# Patient Record
Sex: MALE | Race: WHITE | NOT HISPANIC OR LATINO
[De-identification: names, ages, dates, MRNs, and addresses within clinical notes are randomized per-mention and may not be internally consistent; named-entity substitution may affect disease eponyms.]

---

## 2018-07-30 ENCOUNTER — CHARTING TRANS (OUTPATIENT)
Dept: OTHER | Age: 68
End: 2018-07-30

## 2019-01-01 ENCOUNTER — EXTERNAL RECORD (OUTPATIENT)
Dept: HEALTH INFORMATION MANAGEMENT | Facility: OTHER | Age: 69
End: 2019-01-01

## 2019-06-07 ENCOUNTER — TELEPHONE (OUTPATIENT)
Dept: INTERNAL MEDICINE | Age: 69
End: 2019-06-07

## 2019-06-14 ENCOUNTER — LAB SERVICES (OUTPATIENT)
Dept: LAB | Age: 69
End: 2019-06-14

## 2019-06-14 ENCOUNTER — OFFICE VISIT (OUTPATIENT)
Dept: INTERNAL MEDICINE | Age: 69
End: 2019-06-14

## 2019-06-14 DIAGNOSIS — R55 VASOVAGAL SYNCOPE: ICD-10-CM

## 2019-06-14 DIAGNOSIS — D72.829 LEUKOCYTOSIS, UNSPECIFIED TYPE: ICD-10-CM

## 2019-06-14 DIAGNOSIS — D72.829 LEUKOCYTOSIS, UNSPECIFIED TYPE: Primary | ICD-10-CM

## 2019-06-14 LAB
DIFFERENTIAL TYPE: ABNORMAL
HEMATOCRIT: 46.9 % (ref 40–51)
HEMOGLOBIN: 16 G/DL (ref 13.7–17.5)
LYMPH PERCENT: 15 % (ref 20.5–51.1)
LYMPHOCYTE ABSOLUTE #: 0.8 10*3/UL (ref 1.2–3.4)
MEAN CORPUSCULAR HGB CONCENTRATION: 34.1 % (ref 32–36)
MEAN CORPUSCULAR HGB: 30.7 PG (ref 27–34)
MEAN CORPUSCULAR VOLUME: 90 FL (ref 79–95)
MEAN PLATELET VOLUME: 8.8 FL (ref 8.6–12.4)
MIXED %: 15.3 % (ref 4.3–12.9)
MIXED ABSOLUTE #: 0.8 10*3/UL (ref 0.2–0.9)
NEUTROPHIL ABSOLUTE #: 3.6 10*3/UL (ref 1.4–6.5)
NEUTROPHIL PERCENT: 69.7 % (ref 34–73.5)
PLATELET COUNT: 241 10*3/UL (ref 150–400)
RED BLOOD CELL COUNT: 5.21 10*6/UL (ref 3.9–5.7)
RED CELL DISTRIBUTION WIDTH: 13.3 % (ref 11.3–14.8)
WHITE BLOOD CELL COUNT: 5.2 10*3/UL (ref 4–10)

## 2019-06-14 PROCEDURE — 85025 COMPLETE CBC W/AUTO DIFF WBC: CPT | Performed by: INTERNAL MEDICINE

## 2019-06-14 PROCEDURE — 36415 COLL VENOUS BLD VENIPUNCTURE: CPT | Performed by: INTERNAL MEDICINE

## 2019-06-14 PROCEDURE — 99204 OFFICE O/P NEW MOD 45 MIN: CPT | Performed by: INTERNAL MEDICINE

## 2019-06-14 RX ORDER — ZALEPLON 10 MG/1
10 CAPSULE ORAL
COMMUNITY
Start: 2019-01-22

## 2019-06-14 RX ORDER — BIOTIN 1 MG
TABLET ORAL
COMMUNITY

## 2019-06-14 RX ORDER — FLUOCINONIDE 0.5 MG/G
OINTMENT TOPICAL 2 TIMES DAILY
COMMUNITY

## 2019-06-14 RX ORDER — LANOLIN ALCOHOL/MO/W.PET/CERES
1000 CREAM (GRAM) TOPICAL DAILY
COMMUNITY

## 2019-06-14 RX ORDER — AMLODIPINE BESYLATE 5 MG/1
5 TABLET ORAL
COMMUNITY
Start: 2019-01-22

## 2019-06-14 RX ORDER — FLUOCINONIDE TOPICAL SOLUTION USP, 0.05% 0.5 MG/ML
SOLUTION TOPICAL 2 TIMES DAILY
COMMUNITY

## 2019-06-14 SDOH — HEALTH STABILITY: PHYSICAL HEALTH: ON AVERAGE, HOW MANY DAYS PER WEEK DO YOU ENGAGE IN MODERATE TO STRENUOUS EXERCISE (LIKE A BRISK WALK)?: 0 DAYS

## 2019-06-14 SDOH — HEALTH STABILITY: MENTAL HEALTH
STRESS IS WHEN SOMEONE FEELS TENSE, NERVOUS, ANXIOUS, OR CAN'T SLEEP AT NIGHT BECAUSE THEIR MIND IS TROUBLED. HOW STRESSED ARE YOU?: ONLY A LITTLE

## 2019-06-14 SDOH — HEALTH STABILITY: PHYSICAL HEALTH: ON AVERAGE, HOW MANY MINUTES DO YOU ENGAGE IN EXERCISE AT THIS LEVEL?: 0 MIN

## 2019-06-14 ASSESSMENT — COGNITIVE AND FUNCTIONAL STATUS - GENERAL
BECAUSE OF A PHYSICAL, MENTAL, OR EMOTIONAL CONDITION, DO YOU HAVE SERIOUS DIFFICULTY CONCENTRATING, REMEMBERING OR MAKING DECISIONS: NO
BECAUSE OF A PHYSICAL, MENTAL, OR EMOTIONAL CONDITION, DO YOU HAVE DIFFICULTY DOING ERRANDS ALONE: NO
DO YOU HAVE DIFFICULTY DRESSING OR BATHING: NO
DO YOU HAVE SERIOUS DIFFICULTY WALKING OR CLIMBING STAIRS: NO

## 2019-06-14 ASSESSMENT — PAIN SCALES - GENERAL: PAINLEVEL: 3-4

## 2019-06-14 ASSESSMENT — PATIENT HEALTH QUESTIONNAIRE - PHQ9
2. FEELING DOWN, DEPRESSED OR HOPELESS: SEVERAL DAYS
SUM OF ALL RESPONSES TO PHQ9 QUESTIONS 1 AND 2: 1
SUM OF ALL RESPONSES TO PHQ9 QUESTIONS 1 AND 2: 1
1. LITTLE INTEREST OR PLEASURE IN DOING THINGS: NOT AT ALL

## 2019-08-02 ENCOUNTER — TELEPHONE (OUTPATIENT)
Dept: INTERNAL MEDICINE | Age: 69
End: 2019-08-02

## 2019-08-06 ENCOUNTER — OFFICE VISIT (OUTPATIENT)
Dept: PAIN CLINIC | Facility: HOSPITAL | Age: 69
End: 2019-08-06
Attending: ANESTHESIOLOGY
Payer: MEDICARE

## 2019-08-06 VITALS
WEIGHT: 128 LBS | HEART RATE: 83 BPM | SYSTOLIC BLOOD PRESSURE: 117 MMHG | DIASTOLIC BLOOD PRESSURE: 75 MMHG | RESPIRATION RATE: 18 BRPM | BODY MASS INDEX: 19.4 KG/M2 | HEIGHT: 68 IN

## 2019-08-06 DIAGNOSIS — G90.521 COMPLEX REGIONAL PAIN SYNDROME TYPE 1 OF RIGHT LOWER EXTREMITY: Primary | ICD-10-CM

## 2019-08-06 DIAGNOSIS — M79.671 RIGHT FOOT PAIN: ICD-10-CM

## 2019-08-06 PROBLEM — C61 PROSTATE CA (HCC): Status: ACTIVE | Noted: 2019-08-06

## 2019-08-06 PROCEDURE — 99201 HC OUTPT EVAL AND MGNT NEW PT LEVEL 1: CPT

## 2019-08-06 RX ORDER — MELATONIN
1000
COMMUNITY

## 2019-08-06 RX ORDER — PIMECROLIMUS 10 MG/G
CREAM TOPICAL
COMMUNITY
Start: 2013-07-01

## 2019-08-06 RX ORDER — BETAMETHASONE DIPROPIONATE 0.5 MG/G
CREAM TOPICAL
COMMUNITY
Start: 2012-06-20

## 2019-08-06 RX ORDER — MOMETASONE 50 UG/1
SPRAY, METERED NASAL
COMMUNITY

## 2019-08-06 RX ORDER — FLUTICASONE PROPIONATE 50 MCG
2 SPRAY, SUSPENSION (ML) NASAL
COMMUNITY
Start: 2019-01-22

## 2019-08-06 RX ORDER — CALCIPOTRIENE AND BETAMETHASONE DIPROPIONATE 50; .5 UG/G; MG/G
SUSPENSION TOPICAL
COMMUNITY
Start: 2009-09-30

## 2019-08-06 RX ORDER — OLOPATADINE HYDROCHLORIDE 1 MG/ML
1 SOLUTION/ DROPS OPHTHALMIC
COMMUNITY
Start: 2014-07-24

## 2019-08-06 RX ORDER — TAMSULOSIN HYDROCHLORIDE 0.4 MG/1
CAPSULE ORAL
COMMUNITY
Start: 2018-03-29

## 2019-08-06 RX ORDER — GABAPENTIN 100 MG/1
100 CAPSULE ORAL 2 TIMES DAILY
Qty: 60 CAPSULE | Refills: 0 | Status: SHIPPED | OUTPATIENT
Start: 2019-08-06 | End: 2019-09-05

## 2019-08-06 RX ORDER — ZALEPLON 10 MG/1
10 CAPSULE ORAL
COMMUNITY
Start: 2019-01-22

## 2019-08-06 RX ORDER — AMITRIPTYLINE HYDROCHLORIDE 10 MG/1
10 TABLET, FILM COATED ORAL NIGHTLY
Qty: 30 TABLET | Refills: 0 | Status: SHIPPED | OUTPATIENT
Start: 2019-08-06 | End: 2019-09-05

## 2019-08-06 RX ORDER — FLUOCINONIDE 0.5 MG/G
OINTMENT TOPICAL
COMMUNITY

## 2019-08-06 RX ORDER — AMLODIPINE BESYLATE 5 MG/1
5 TABLET ORAL
COMMUNITY
Start: 2019-01-22

## 2019-08-06 RX ORDER — NAPROXEN 500 MG/1
500 TABLET ORAL
COMMUNITY
Start: 2019-01-22

## 2019-08-06 RX ORDER — BIOTIN 1 MG
TABLET ORAL
COMMUNITY

## 2019-08-06 NOTE — PROGRESS NOTES
08/06/19  INITIAL CONSULT:  PRESENTS IN E=WC ACCOMPANIED BY HIS WIFE;  NEW CONSULT C/O BILATERAL FT PAIN > LT;  PT REPORTS 1YR HX OF PAIN;  STATES HE FELL  BACK IN 1994 AND DEVELOPED A HIGH ANKLE SPRAIN TO THE LT ANKLE;  IS UNSURE IF THIS IS RELATED;  PT S

## 2019-08-06 NOTE — CHRONIC PAIN
[facility]  Pain Consultation     HISTORY OF PRESENT ILLNESS:  Brittany Sherwood is a 68year old old male referred to the pain clinic by Dr. Abby bashir.  provider found for Complex regional pain syndrome type 1 of right lower extremity  (primary Disp:  Rfl:    Fluticasone Propionate 50 MCG/ACT Nasal Suspension 2 sprays by Nasal route. Disp:  Rfl:    naproxen 500 MG Oral Tab Take 500 mg by mouth. Disp:  Rfl:    Olopatadine HCl 0.1 % Ophthalmic Solution Apply 1 drop to eye.  Disp:  Rfl:    Pimecrolim Negative  Psychiatric:  Negative  Hematologic: No active bleeding  Lymphatic: No current lymphedema  Allergic/Immunologic:  Negative  Musculoskeletal: As above  Neurological: As above    MEDICAL HISTORY:  Patient Active Problem List:     Prostate CA ([facility] [address] on file        Forced sexual activity: Not on file    Other Topics      Concerns:        Not on file    Social History Narrative      Not on file      ADVANCE CARE PLANNING:  Advance Care Plan NOT discussed.     PHYSICAL EXAMINATION:   08/06/19  0732   BP: within the retrocalcaneal bursa. There is mild nonspecific subcutaneous edema within the medial and lateral soft tissues of the  ankle. There is mild fluid within the peroneal tendon sheath suggesting mild tenosynovitis.  The  peroneal tendons otherwise ap unilateral/patchy block, difficulty breathing, infection, bleeding, nerve damage, and paralysis. The patient desires the proposed injection as planned. Comprehensive analgesic plan was formulated.  Conservative vs. Aggressive measures were discussed at

## 2019-08-08 ENCOUNTER — HOSPITAL ENCOUNTER (OUTPATIENT)
Dept: NUCLEAR MEDICINE | Facility: HOSPITAL | Age: 69
Discharge: HOME | End: 2019-08-08
Attending: ANESTHESIOLOGY
Payer: MEDICARE

## 2019-08-08 DIAGNOSIS — M79.671 RIGHT FOOT PAIN: ICD-10-CM

## 2019-08-08 DIAGNOSIS — G90.521 COMPLEX REGIONAL PAIN SYNDROME TYPE 1 OF RIGHT LOWER EXTREMITY: ICD-10-CM

## 2019-08-08 PROCEDURE — 78315 BONE IMAGING 3 PHASE: CPT | Performed by: ANESTHESIOLOGY

## 2019-08-13 ENCOUNTER — TELEPHONE (OUTPATIENT)
Dept: PAIN CLINIC | Facility: HOSPITAL | Age: 69
End: 2019-08-13

## 2019-08-13 NOTE — TELEPHONE ENCOUNTER
Called patient with results after discussing with Dr. Christine Hdez. Will plan for right paravertebral block.

## 2019-08-16 ENCOUNTER — DOCUMENTATION ONLY (OUTPATIENT)
Dept: PAIN CLINIC | Facility: HOSPITAL | Age: 69
End: 2019-08-16

## 2019-08-16 NOTE — PROGRESS NOTES
Procedure code 72011 (right Paravertebral block) --Scheduled for 08/20/19    No PA needed per pt's insurance     Order form sent to the surgery center, confirmation rcv'd

## 2019-09-05 RX ORDER — AMITRIPTYLINE HYDROCHLORIDE 10 MG/1
10 TABLET, FILM COATED ORAL NIGHTLY
Qty: 30 TABLET | Refills: 2 | Status: SHIPPED | OUTPATIENT
Start: 2019-09-05 | End: 2019-12-06

## 2019-09-05 RX ORDER — GABAPENTIN 100 MG/1
100 CAPSULE ORAL 2 TIMES DAILY
Qty: 60 CAPSULE | Refills: 2 | Status: SHIPPED | OUTPATIENT
Start: 2019-09-05 | End: 2019-10-05

## 2019-09-13 ENCOUNTER — OFFICE VISIT (OUTPATIENT)
Dept: PAIN CLINIC | Facility: HOSPITAL | Age: 69
End: 2019-09-13
Attending: NURSE PRACTITIONER
Payer: MEDICARE

## 2019-09-13 ENCOUNTER — DOCUMENTATION ONLY (OUTPATIENT)
Dept: PAIN CLINIC | Facility: HOSPITAL | Age: 69
End: 2019-09-13

## 2019-09-13 VITALS — DIASTOLIC BLOOD PRESSURE: 64 MMHG | SYSTOLIC BLOOD PRESSURE: 106 MMHG | HEART RATE: 67 BPM

## 2019-09-13 DIAGNOSIS — M79.671 RIGHT FOOT PAIN: ICD-10-CM

## 2019-09-13 DIAGNOSIS — G90.521 COMPLEX REGIONAL PAIN SYNDROME TYPE 1 OF RIGHT LOWER EXTREMITY: Primary | ICD-10-CM

## 2019-09-13 PROCEDURE — 99211 OFF/OP EST MAY X REQ PHY/QHP: CPT

## 2019-09-13 NOTE — CHRONIC PAIN
Follow-up Note  CC: right foot pain   HISTORY OF PRESENT ILLNESS:  Bertha Avila is a 68year old old male, originally referred to the pain clinic by Dr. Mora ref.  provider found, with history of Complex regional pain syndrome type 1 of right lower extremi Tab Take 1,000 mcg by mouth. Disp:  Rfl:    Fluocinonide 0.05 % External Ointment Apply topically. Disp:  Rfl:    Fluticasone Propionate 50 MCG/ACT Nasal Suspension 2 sprays by Nasal route. Disp:  Rfl:    naproxen 500 MG Oral Tab Take 500 mg by mouth.  Disp History:   Procedure Laterality Date   • EXCISE EXCESS SKIN TISSUE,ARM  2018    MELONOMA   • TONSILLECTOMY         FAMILY HISTORY:  History reviewed. No pertinent family history.     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          Dictated by: Nolan Kendrick MD on 8/08/2019 at 15:22       Approved by: Nolan Kendrick MD                [place] PHYSICIAN MONITORING PROGRAM REVIEWED  Yes      ASSESSMENT AND PLAN:    Mariama Shahid

## 2019-09-13 NOTE — PROGRESS NOTES
Procedure code [id number] for 09/16/19 and 09/18/19    No PA needed per pt's insurance     Order form sent to the surgery center, confirmation rcv'd

## 2019-09-13 NOTE — PROGRESS NOTES
Patient presents to [facility] in wheelchair for follow up lumbar sympathetic nerve block. He reports 1 day of relief. He does not feel any better since the first injection, this was his third. He takes gabapentin. Wife states he is loosing weight.   MARIE Alvarez

## 2019-09-19 ENCOUNTER — TELEPHONE (OUTPATIENT)
Dept: PAIN CLINIC | Facility: HOSPITAL | Age: 69
End: 2019-09-19

## 2019-09-20 ENCOUNTER — TELEPHONE (OUTPATIENT)
Dept: PAIN CLINIC | Facility: HOSPITAL | Age: 69
End: 2019-09-20

## 2019-09-20 NOTE — TELEPHONE ENCOUNTER
Spoke with patient after speaking with Dr. Sarkis Schuster. Patient reports completing PT this AM. [facility] \" He states that his pain level is a 1-2/10 following PT. He states that the swelling is still reduced at this point.  Patient will srinivasan

## 2019-09-23 ENCOUNTER — TELEPHONE (OUTPATIENT)
Dept: PAIN CLINIC | Facility: HOSPITAL | Age: 69
End: 2019-09-23

## 2019-09-23 NOTE — TELEPHONE ENCOUNTER
Returned patients call. He reports that the swelling in the foot has increased as well as his pain score. He reports that his pain is a 3/10 but \"creeping up. \" Will plan to repeat block tomorrow with Dr. Radha Colin.

## 2019-09-30 ENCOUNTER — TELEPHONE (OUTPATIENT)
Dept: PAIN CLINIC | Facility: HOSPITAL | Age: 69
End: 2019-09-30

## 2019-09-30 RX ORDER — GABAPENTIN 100 MG/1
100 CAPSULE ORAL 3 TIMES DAILY
Qty: 90 CAPSULE | Refills: 0 | Status: SHIPPED | OUTPATIENT
Start: 2019-09-30 | End: 2019-10-30

## 2019-10-01 ENCOUNTER — DOCUMENTATION ONLY (OUTPATIENT)
Dept: PAIN CLINIC | Facility: HOSPITAL | Age: 69
End: 2019-10-01

## 2019-10-17 ENCOUNTER — OFFICE VISIT (OUTPATIENT)
Dept: PAIN CLINIC | Facility: HOSPITAL | Age: 69
End: 2019-10-17
Attending: NURSE PRACTITIONER
Payer: MEDICARE

## 2019-10-17 VITALS
RESPIRATION RATE: 18 BRPM | WEIGHT: 128 LBS | DIASTOLIC BLOOD PRESSURE: 73 MMHG | HEART RATE: 87 BPM | BODY MASS INDEX: 19.4 KG/M2 | SYSTOLIC BLOOD PRESSURE: 102 MMHG | HEIGHT: 68 IN

## 2019-10-17 DIAGNOSIS — M79.671 RIGHT FOOT PAIN: ICD-10-CM

## 2019-10-17 DIAGNOSIS — G90.521 COMPLEX REGIONAL PAIN SYNDROME TYPE 1 OF RIGHT LOWER EXTREMITY: Primary | ICD-10-CM

## 2019-10-17 PROCEDURE — 99211 OFF/OP EST MAY X REQ PHY/QHP: CPT

## 2019-10-17 RX ORDER — GABAPENTIN 100 MG/1
200 CAPSULE ORAL 3 TIMES DAILY
Qty: 180 CAPSULE | Refills: 2 | Status: SHIPPED | OUTPATIENT
Start: 2019-10-17 | End: 2019-11-16

## 2019-10-17 NOTE — PROGRESS NOTES
10/17/19  PRESENTS AMBULATORY TO [facility];  F/U POST RT PARAVERTEBRAL N.DAWSON. 9/24/19-BELAVIC;  PT HAS HAD A SERIES OF THESE  INJECTIONS;  HE REPORTS MINIMAL RELIEF ;  RATES HIS PAIN 7/10;  STATES THE ELAVIL & GABAPENTIN HAS HELPED; PT IS SLEEPING BETTER;  THEY

## 2019-10-17 NOTE — CHRONIC PAIN
Follow-up Note  CC: right foot pain   HISTORY OF PRESENT ILLNESS:  Adam Pederson is a 68year old old male, originally referred to the pain clinic by Dr. Mora ref.  provider found, with history of Complex regional pain syndrome type 1 of right lower extremi Suspension, use daily or bid on scalp, Disp: , Rfl:   Vitamin B-12 1000 MCG Oral Tab, Take 1,000 mcg by mouth., Disp: , Rfl:   Fluocinonide 0.05 % External Ointment, Apply topically. , Disp: , Rfl:   Fluticasone Propionate 50 MCG/ACT Nasal Suspension, 2 spr 2018   • Hip pain, chronic, right 2017   • Shoulder pain, right        SURGICAL HISTORY:  Past Surgical History:   Procedure Laterality Date   • EXCISE EXCESS SKIN TISSUE,ARM  2018    MELONOMA   • TONSILLECTOMY         FAMILY HISTORY:  No family history on age, appropriate disposition and demeanor, answers questions appropriately   Head: normocephalic, atraumatic  Eyes: anicteric; no injection  Ears: no obvious deformities noted   Nose: externally grossly within normal limits, no unusual discharge or rhinorr delayed images. The findings may represent complex regional pain syndrome in the appropriate clinical setting.          Dictated by: Antonio Castro MD on 8/08/2019 at 15:22       Approved by: Antonio Castro MD                [address]

## 2019-10-22 ENCOUNTER — MED REC SCAN ONLY (OUTPATIENT)
Dept: FAMILY MEDICINE CLINIC | Facility: CLINIC | Age: 69
End: 2019-10-22

## 2019-12-06 RX ORDER — AMITRIPTYLINE HYDROCHLORIDE 10 MG/1
TABLET, FILM COATED ORAL
Qty: 90 TABLET | Refills: 0 | Status: SHIPPED | OUTPATIENT
Start: 2019-12-06

## 2020-06-01 ENCOUNTER — OFFICE VISIT (OUTPATIENT)
Dept: URBAN - METROPOLITAN AREA SURGERY CENTER 12 | Facility: SURGERY CENTER | Age: 70
End: 2020-06-01

## 2020-06-02 ENCOUNTER — TELEPHONE ENCOUNTER (OUTPATIENT)
Dept: URBAN - METROPOLITAN AREA CLINIC 68 | Facility: CLINIC | Age: 70
End: 2020-06-02

## 2021-03-25 ENCOUNTER — OFFICE VISIT (OUTPATIENT)
Dept: URBAN - METROPOLITAN AREA CLINIC 68 | Facility: CLINIC | Age: 71
End: 2021-03-25

## 2021-04-22 ENCOUNTER — OFFICE VISIT (OUTPATIENT)
Dept: URBAN - METROPOLITAN AREA CLINIC 68 | Facility: CLINIC | Age: 71
End: 2021-04-22

## 2021-05-25 VITALS
HEIGHT: 68 IN | SYSTOLIC BLOOD PRESSURE: 116 MMHG | DIASTOLIC BLOOD PRESSURE: 70 MMHG | HEART RATE: 92 BPM | BODY MASS INDEX: 20.22 KG/M2 | WEIGHT: 133.4 LBS | RESPIRATION RATE: 20 BRPM | TEMPERATURE: 97.8 F

## 2021-05-27 ENCOUNTER — TELEPHONE (OUTPATIENT)
Dept: INTERNAL MEDICINE | Age: 71
End: 2021-05-27

## 2021-10-27 ENCOUNTER — OFFICE VISIT (OUTPATIENT)
Dept: URBAN - METROPOLITAN AREA CLINIC 68 | Facility: CLINIC | Age: 71
End: 2021-10-27

## 2022-04-04 ENCOUNTER — OFFICE VISIT (OUTPATIENT)
Dept: URBAN - METROPOLITAN AREA CLINIC 68 | Facility: CLINIC | Age: 72
End: 2022-04-04

## 2022-04-12 ENCOUNTER — TELEPHONE ENCOUNTER (OUTPATIENT)
Dept: URBAN - METROPOLITAN AREA CLINIC 68 | Facility: CLINIC | Age: 72
End: 2022-04-12

## 2022-04-25 ENCOUNTER — OFFICE VISIT (OUTPATIENT)
Dept: URBAN - METROPOLITAN AREA SURGERY CENTER 12 | Facility: SURGERY CENTER | Age: 72
End: 2022-04-25

## 2022-04-26 ENCOUNTER — LAB OUTSIDE AN ENCOUNTER (OUTPATIENT)
Age: 72
End: 2022-04-26

## 2022-04-26 LAB — 01: (no result)

## 2022-06-04 ENCOUNTER — TELEPHONE ENCOUNTER (OUTPATIENT)
Dept: URBAN - METROPOLITAN AREA CLINIC 68 | Facility: CLINIC | Age: 72
End: 2022-06-04

## 2022-06-04 RX ORDER — SODIUM SULFATE, POTASSIUM SULFATE, MAGNESIUM SULFATE 17.5; 3.13; 1.6 G/ML; G/ML; G/ML
SOLUTION, CONCENTRATE ORAL AS DIRECTED
Qty: 1 | Refills: 0 | OUTPATIENT
Start: 2020-05-26 | End: 2020-05-27

## 2022-06-04 RX ORDER — AMLODIPINE BESYLATE 10 MG/1
AMLODIPINE BESYLATE( 10MG ORAL 1 DAILY ) INACTIVE -HX ENTRY TABLET ORAL DAILY
OUTPATIENT
Start: 2022-04-04

## 2022-06-04 RX ORDER — GABAPENTIN 300 MG/1
GABAPENTIN( 300MG ORAL 1 THREE TIMES DAILY ) INACTIVE -HX ENTRY CAPSULE ORAL
OUTPATIENT
Start: 2022-04-04

## 2022-06-04 RX ORDER — NORTRIPTYLINE HYDROCHLORIDE 25 MG/1
NORTRIPTYLINE HCL( 25MG ORAL 1 DAILY ) INACTIVE -HX ENTRY CAPSULE ORAL DAILY
OUTPATIENT
Start: 2022-04-04

## 2022-06-05 ENCOUNTER — TELEPHONE ENCOUNTER (OUTPATIENT)
Dept: URBAN - METROPOLITAN AREA CLINIC 68 | Facility: CLINIC | Age: 72
End: 2022-06-05

## 2022-06-05 RX ORDER — PANCRELIPASE 36000; 180000; 114000 [USP'U]/1; [USP'U]/1; [USP'U]/1
CAPSULE, DELAYED RELEASE PELLETS ORAL
Qty: 180 | Refills: 180 | Status: ACTIVE | COMMUNITY
Start: 2022-04-04

## 2022-06-05 RX ORDER — LORATADINE 10 MG
TABLET,DISINTEGRATING ORAL
Qty: 60 | Refills: 60 | Status: ACTIVE | COMMUNITY
Start: 2020-05-26

## 2022-06-05 RX ORDER — ZALEPLON 10 MG/1
ZALEPLON( 10MG ORAL  AS NEEDED ) ACTIVE -HX ENTRY CAPSULE ORAL AS NEEDED
Status: ACTIVE | COMMUNITY
Start: 2022-04-04

## 2022-06-05 RX ORDER — OMEPRAZOLE 40 MG/1
CAPSULE, DELAYED RELEASE PELLETS ORAL DAILY
Qty: 90 | Refills: 90 | Status: ACTIVE | COMMUNITY
Start: 2020-05-26

## 2022-06-25 ENCOUNTER — TELEPHONE ENCOUNTER (OUTPATIENT)
Age: 72
End: 2022-06-25

## 2022-06-25 RX ORDER — NORTRIPTYLINE HYDROCHLORIDE 25 MG/1
NORTRIPTYLINE HCL( 25MG ORAL 1 DAILY ) INACTIVE -HX ENTRY CAPSULE ORAL DAILY
OUTPATIENT
Start: 2022-04-04

## 2022-06-25 RX ORDER — AMLODIPINE BESYLATE 10 MG/1
AMLODIPINE BESYLATE( 10MG ORAL 1 DAILY ) INACTIVE -HX ENTRY TABLET ORAL DAILY
OUTPATIENT
Start: 2022-04-04

## 2022-06-25 RX ORDER — GABAPENTIN 300 MG/1
GABAPENTIN( 300MG ORAL 1 THREE TIMES DAILY ) INACTIVE -HX ENTRY CAPSULE ORAL
OUTPATIENT
Start: 2022-04-04

## 2022-06-25 RX ORDER — SODIUM SULFATE, POTASSIUM SULFATE, MAGNESIUM SULFATE 17.5; 3.13; 1.6 G/ML; G/ML; G/ML
SOLUTION, CONCENTRATE ORAL AS DIRECTED
Qty: 1 | Refills: 0 | OUTPATIENT
Start: 2020-05-26 | End: 2020-05-27

## 2022-06-26 ENCOUNTER — TELEPHONE ENCOUNTER (OUTPATIENT)
Age: 72
End: 2022-06-26

## 2022-06-26 RX ORDER — PANCRELIPASE 36000; 180000; 114000 [USP'U]/1; [USP'U]/1; [USP'U]/1
CAPSULE, DELAYED RELEASE PELLETS ORAL
Qty: 180 | Refills: 180 | Status: ACTIVE | COMMUNITY
Start: 2022-04-04

## 2022-06-26 RX ORDER — OMEPRAZOLE 40 MG/1
CAPSULE, DELAYED RELEASE ORAL DAILY
Qty: 90 | Refills: 90 | Status: ACTIVE | COMMUNITY
Start: 2020-05-26

## 2022-06-26 RX ORDER — TAMSULOSIN HYDROCHLORIDE 0.4 MG/1
TAMSULOSIN HCL( 0.4MG ORAL 1 DAILY ) ACTIVE -HX ENTRY CAPSULE ORAL DAILY
Status: ACTIVE | COMMUNITY
Start: 2022-04-04

## 2022-06-26 RX ORDER — ZALEPLON 10 MG/1
ZALEPLON( 10MG ORAL  AS NEEDED ) ACTIVE -HX ENTRY CAPSULE ORAL AS NEEDED
Status: ACTIVE | COMMUNITY
Start: 2022-04-04

## 2022-06-26 RX ORDER — POLYETHYLENE GLYCOL 3350 17 G
POWDER IN PACKET (EA) ORAL
Qty: 60 | Refills: 60 | Status: ACTIVE | COMMUNITY
Start: 2020-05-26

## 2024-05-06 ENCOUNTER — TELEPHONE ENCOUNTER (OUTPATIENT)
Dept: URBAN - METROPOLITAN AREA CLINIC 23 | Facility: CLINIC | Age: 74
End: 2024-05-06

## 2024-06-27 ENCOUNTER — LAB OUTSIDE AN ENCOUNTER (OUTPATIENT)
Dept: URBAN - METROPOLITAN AREA CLINIC 68 | Facility: CLINIC | Age: 74
End: 2024-06-27

## 2024-06-27 ENCOUNTER — OFFICE VISIT (OUTPATIENT)
Dept: URBAN - METROPOLITAN AREA CLINIC 68 | Facility: CLINIC | Age: 74
End: 2024-06-27

## 2024-06-27 ENCOUNTER — DASHBOARD ENCOUNTERS (OUTPATIENT)
Age: 74
End: 2024-06-27

## 2024-06-27 VITALS
HEART RATE: 72 BPM | HEIGHT: 68 IN | OXYGEN SATURATION: 96 % | SYSTOLIC BLOOD PRESSURE: 120 MMHG | WEIGHT: 132 LBS | DIASTOLIC BLOOD PRESSURE: 80 MMHG | BODY MASS INDEX: 20 KG/M2

## 2024-06-27 PROBLEM — 444316004: Status: ACTIVE | Noted: 2024-06-27

## 2024-06-27 RX ORDER — ZALEPLON 10 MG/1
ZALEPLON( 10MG ORAL  AS NEEDED ) ACTIVE -HX ENTRY CAPSULE ORAL AS NEEDED
Status: ACTIVE | COMMUNITY
Start: 2022-04-04

## 2024-06-27 RX ORDER — AMIODARONE HCL 200 MG
1 TABLET TABLET ORAL ONCE A DAY
Status: ACTIVE | COMMUNITY

## 2024-06-27 RX ORDER — OMEPRAZOLE 20 MG/1
1 CAPSULE 30 MINUTES BEFORE MORNING MEAL CAPSULE, DELAYED RELEASE ORAL ONCE A DAY
Status: ACTIVE | COMMUNITY

## 2024-06-27 RX ORDER — LORATADINE 10 MG
TABLET,DISINTEGRATING ORAL
Qty: 60 | Refills: 60 | Status: ON HOLD | COMMUNITY
Start: 2020-05-26

## 2024-06-27 RX ORDER — ATORVASTATIN CALCIUM, FILM COATED 40 MG/1
TABLET ORAL
Qty: 90 TABLET | Status: ACTIVE | COMMUNITY

## 2024-06-27 RX ORDER — SERTRALINE 100 MG/1
1 TABLET TABLET, FILM COATED ORAL ONCE A DAY
Status: ACTIVE | COMMUNITY

## 2024-06-27 RX ORDER — OMEPRAZOLE 20 MG/1
1 CAPSULE 30 MINUTES BEFORE MORNING MEAL CAPSULE, DELAYED RELEASE ORAL ONCE A DAY
OUTPATIENT

## 2024-06-27 RX ORDER — POLYETHYLENE GLYCOL 3350 17 G
POWDER IN PACKET (EA) ORAL
Qty: 60 | Refills: 60 | Status: ON HOLD | COMMUNITY
Start: 2020-05-26

## 2024-06-27 NOTE — HPI-TODAY'S VISIT:
was seen 2022 and   belching and vomited this morning , vomiting is occasional  weight is stabe   SP cryotherapy for proxtate  CABG July 2022  previously dx as pnac insufficienty creaon therapy was [id number] and discontinued in 2022, not clearly effective

## 2024-07-12 ENCOUNTER — CLAIMS CREATED FROM THE CLAIM WINDOW (OUTPATIENT)
Dept: URBAN - METROPOLITAN AREA CLINIC 4 | Facility: CLINIC | Age: 74
End: 2024-07-12
Payer: MEDICARE

## 2024-07-12 ENCOUNTER — CLAIMS CREATED FROM THE CLAIM WINDOW (OUTPATIENT)
Dept: URBAN - METROPOLITAN AREA SURGERY CENTER 12 | Facility: SURGERY CENTER | Age: 74
End: 2024-07-12
Payer: MEDICARE

## 2024-07-12 DIAGNOSIS — K31.89 OTHER DISEASES OF STOMACH AND DUODENUM: ICD-10-CM

## 2024-07-12 DIAGNOSIS — K29.70 GASTRITIS, UNSPECIFIED, WITHOUT BLEEDING: ICD-10-CM

## 2024-07-12 DIAGNOSIS — K29.81 DUODENITIS WITH BLEEDING: ICD-10-CM

## 2024-07-12 DIAGNOSIS — K29.80 DUODENITIS WITHOUT BLEEDING: ICD-10-CM

## 2024-07-12 PROCEDURE — 88305 TISSUE EXAM BY PATHOLOGIST: CPT | Performed by: PATHOLOGY

## 2024-07-12 PROCEDURE — 43239 EGD BIOPSY SINGLE/MULTIPLE: CPT | Performed by: CLINIC/CENTER

## 2024-07-12 PROCEDURE — 88312 SPECIAL STAINS GROUP 1: CPT | Performed by: PATHOLOGY

## 2024-07-12 PROCEDURE — 00731 ANES UPR GI NDSC PX NOS: CPT | Performed by: NURSE ANESTHETIST, CERTIFIED REGISTERED

## 2024-07-12 PROCEDURE — 43239 EGD BIOPSY SINGLE/MULTIPLE: CPT | Performed by: SPECIALIST

## 2024-07-12 RX ORDER — SERTRALINE 100 MG/1
1 TABLET TABLET, FILM COATED ORAL ONCE A DAY
Status: ACTIVE | COMMUNITY

## 2024-07-12 RX ORDER — POLYETHYLENE GLYCOL 3350 17 G
POWDER IN PACKET (EA) ORAL
Qty: 60 | Refills: 60 | Status: ON HOLD | COMMUNITY
Start: 2020-05-26

## 2024-07-12 RX ORDER — LORATADINE 10 MG
TABLET,DISINTEGRATING ORAL
Qty: 60 | Refills: 60 | Status: ON HOLD | COMMUNITY
Start: 2020-05-26

## 2024-07-12 RX ORDER — OMEPRAZOLE 20 MG/1
1 CAPSULE 30 MINUTES BEFORE MORNING MEAL CAPSULE, DELAYED RELEASE ORAL ONCE A DAY
Status: ACTIVE | COMMUNITY

## 2024-07-12 RX ORDER — AMIODARONE HCL 200 MG
1 TABLET TABLET ORAL ONCE A DAY
Status: ACTIVE | COMMUNITY

## 2024-07-12 RX ORDER — ATORVASTATIN CALCIUM, FILM COATED 40 MG/1
TABLET ORAL
Qty: 90 TABLET | Status: ACTIVE | COMMUNITY

## 2024-07-12 RX ORDER — ZALEPLON 10 MG/1
ZALEPLON( 10MG ORAL  AS NEEDED ) ACTIVE -HX ENTRY CAPSULE ORAL AS NEEDED
Status: ACTIVE | COMMUNITY
Start: 2022-04-04

## 2024-07-26 ENCOUNTER — LAB OUTSIDE AN ENCOUNTER (OUTPATIENT)
Dept: URBAN - METROPOLITAN AREA CLINIC 68 | Facility: CLINIC | Age: 74
End: 2024-07-26

## 2024-07-26 ENCOUNTER — OFFICE VISIT (OUTPATIENT)
Dept: URBAN - METROPOLITAN AREA CLINIC 68 | Facility: CLINIC | Age: 74
End: 2024-07-26

## 2024-07-26 VITALS
BODY MASS INDEX: 20 KG/M2 | OXYGEN SATURATION: 96 % | HEIGHT: 68 IN | HEART RATE: 67 BPM | WEIGHT: 132 LBS | DIASTOLIC BLOOD PRESSURE: 80 MMHG | SYSTOLIC BLOOD PRESSURE: 120 MMHG

## 2024-07-26 RX ORDER — OMEPRAZOLE 40 MG/1
1 CAPSULE CAPSULE, DELAYED RELEASE PELLETS ORAL ONCE A DAY
Qty: 30 CAPSULE | Refills: 11 | OUTPATIENT

## 2024-07-26 RX ORDER — ATORVASTATIN CALCIUM, FILM COATED 40 MG/1
TABLET ORAL
Qty: 90 TABLET | Status: ACTIVE | COMMUNITY

## 2024-07-26 RX ORDER — OMEPRAZOLE 20 MG/1
1 CAPSULE 30 MINUTES BEFORE MORNING MEAL CAPSULE, DELAYED RELEASE ORAL ONCE A DAY
Status: ACTIVE | COMMUNITY

## 2024-07-26 RX ORDER — ZALEPLON 10 MG/1
ZALEPLON( 10MG ORAL  AS NEEDED ) ACTIVE -HX ENTRY CAPSULE ORAL AS NEEDED
Status: ACTIVE | COMMUNITY
Start: 2022-04-04

## 2024-07-26 RX ORDER — SERTRALINE 100 MG/1
1 TABLET TABLET, FILM COATED ORAL ONCE A DAY
Status: ACTIVE | COMMUNITY

## 2024-07-26 RX ORDER — AMIODARONE HCL 200 MG
1 TABLET TABLET ORAL ONCE A DAY
Status: ACTIVE | COMMUNITY

## 2024-07-26 NOTE — HPI-TODAY'S VISIT:
7/27  SP egd revealed duidenitis and gastritis  symptoms resoloved on medication  IMPRESSION PERSISTANT DYSPEPSIA AND BELCHING   Ro Biliary causes    PLAN  increase to 40 mg daily omeproazole US adn lfts  PRIOR Was seen 2022   belching and vomited this morning, vomiting is occasional  weight is stable   SP cryotherapy for prostate  CABG July 2022x  previously dx as pancreatic insufficiency Creon therapy was expensive and discontinued in 2022, not clearly effective

## 2024-08-08 ENCOUNTER — LAB OUTSIDE AN ENCOUNTER (OUTPATIENT)
Dept: URBAN - METROPOLITAN AREA CLINIC 68 | Facility: CLINIC | Age: 74
End: 2024-08-08

## 2024-08-27 ENCOUNTER — OFFICE VISIT (OUTPATIENT)
Dept: URBAN - METROPOLITAN AREA CLINIC 68 | Facility: CLINIC | Age: 74
End: 2024-08-27

## 2025-03-21 ENCOUNTER — TELEPHONE ENCOUNTER (OUTPATIENT)
Dept: URBAN - METROPOLITAN AREA CLINIC 68 | Facility: CLINIC | Age: 75
End: 2025-03-21

## 2025-03-21 RX ORDER — OMEPRAZOLE 40 MG/1
1 CAPSULE CAPSULE, DELAYED RELEASE PELLETS ORAL ONCE A DAY
Qty: 30 | Refills: 11

## 2025-07-25 ENCOUNTER — OFFICE VISIT (OUTPATIENT)
Dept: INTERNAL MEDICINE | Age: 75
End: 2025-07-25

## 2025-07-25 ENCOUNTER — IMAGING SERVICES (OUTPATIENT)
Dept: GENERAL RADIOLOGY | Age: 75
End: 2025-07-25
Attending: INTERNAL MEDICINE

## 2025-07-25 VITALS
OXYGEN SATURATION: 98 % | BODY MASS INDEX: 20.51 KG/M2 | DIASTOLIC BLOOD PRESSURE: 76 MMHG | HEIGHT: 68 IN | WEIGHT: 135.3 LBS | HEART RATE: 56 BPM | TEMPERATURE: 96.9 F | RESPIRATION RATE: 16 BRPM | SYSTOLIC BLOOD PRESSURE: 130 MMHG

## 2025-07-25 DIAGNOSIS — M79.645 PAIN IN FINGER OF LEFT HAND: Primary | ICD-10-CM

## 2025-07-25 DIAGNOSIS — S56.419A TRAUMATIC RUPTURE OF EXTENSOR TENDON OF FINGER: ICD-10-CM

## 2025-07-25 DIAGNOSIS — M79.645 PAIN IN FINGER OF LEFT HAND: ICD-10-CM

## 2025-07-25 PROCEDURE — 73140 X-RAY EXAM OF FINGER(S): CPT | Performed by: RADIOLOGY

## 2025-07-25 RX ORDER — IBUPROFEN 800 MG/1
800 TABLET, FILM COATED ORAL EVERY 6 HOURS PRN
COMMUNITY

## 2025-07-25 RX ORDER — OMEPRAZOLE 40 MG/1
40 CAPSULE, DELAYED RELEASE ORAL DAILY
COMMUNITY
Start: 2025-03-25

## 2025-07-25 RX ORDER — MIDODRINE HYDROCHLORIDE 5 MG/1
5 TABLET ORAL PRN
COMMUNITY

## 2025-07-25 RX ORDER — AMIODARONE HYDROCHLORIDE 100 MG/1
100 TABLET ORAL DAILY
COMMUNITY

## 2025-07-25 RX ORDER — ATORVASTATIN CALCIUM 40 MG/1
40 TABLET, FILM COATED ORAL DAILY
COMMUNITY
Start: 2025-03-25

## 2025-07-25 RX ORDER — CEPHALEXIN 500 MG/1
500 TABLET, FILM COATED ORAL 3 TIMES DAILY
Qty: 30 TABLET | Refills: 0 | Status: SHIPPED | OUTPATIENT
Start: 2025-07-25 | End: 2025-08-04

## 2025-07-25 ASSESSMENT — PATIENT HEALTH QUESTIONNAIRE - PHQ9
SUM OF ALL RESPONSES TO PHQ9 QUESTIONS 1 AND 2: 0
CLINICAL INTERPRETATION OF PHQ2 SCORE: NO FURTHER SCREENING NEEDED
SUM OF ALL RESPONSES TO PHQ9 QUESTIONS 1 AND 2: 0
1. LITTLE INTEREST OR PLEASURE IN DOING THINGS: NOT AT ALL
2. FEELING DOWN, DEPRESSED OR HOPELESS: NOT AT ALL

## 2025-08-08 ENCOUNTER — OFFICE VISIT (OUTPATIENT)
Dept: INTERNAL MEDICINE | Age: 75
End: 2025-08-08

## 2025-08-08 VITALS
OXYGEN SATURATION: 98 % | WEIGHT: 135 LBS | SYSTOLIC BLOOD PRESSURE: 124 MMHG | HEART RATE: 63 BPM | BODY MASS INDEX: 20.53 KG/M2 | TEMPERATURE: 97.1 F | DIASTOLIC BLOOD PRESSURE: 70 MMHG

## 2025-08-08 DIAGNOSIS — S56.419A TRAUMATIC RUPTURE OF EXTENSOR TENDON OF FINGER: ICD-10-CM

## 2025-08-08 DIAGNOSIS — M79.645 PAIN IN FINGER OF LEFT HAND: Primary | ICD-10-CM

## 2025-08-11 ENCOUNTER — APPOINTMENT (OUTPATIENT)
Dept: SPORTS MEDICINE | Age: 75
End: 2025-08-11
Attending: HOSPITALIST

## 2025-08-11 ENCOUNTER — TELEPHONE (OUTPATIENT)
Dept: SPORTS MEDICINE | Age: 75
End: 2025-08-11

## 2025-08-11 DIAGNOSIS — S69.92XS FINGER INJURY, LEFT, SEQUELA: ICD-10-CM

## 2025-08-11 DIAGNOSIS — I10 ESSENTIAL (PRIMARY) HYPERTENSION: ICD-10-CM

## 2025-08-11 DIAGNOSIS — M20.012 MALLET DEFORMITY OF LEFT MIDDLE FINGER: Primary | ICD-10-CM

## 2025-08-11 ASSESSMENT — ENCOUNTER SYMPTOMS
SLEEP DISTURBANCE: 0
ABDOMINAL PAIN: 0
CHILLS: 0
DIZZINESS: 0
CHEST TIGHTNESS: 0
FEVER: 0
DIARRHEA: 0
EYE PAIN: 0
SHORTNESS OF BREATH: 0
CONFUSION: 0
NERVOUS/ANXIOUS: 0
COUGH: 0
VOMITING: 0
SORE THROAT: 0
SEIZURES: 0
FATIGUE: 0
WOUND: 0

## 2025-09-25 ENCOUNTER — APPOINTMENT (OUTPATIENT)
Dept: SPORTS MEDICINE | Age: 75
End: 2025-09-25